# Patient Record
Sex: MALE | Race: BLACK OR AFRICAN AMERICAN | NOT HISPANIC OR LATINO | Employment: FULL TIME | URBAN - METROPOLITAN AREA
[De-identification: names, ages, dates, MRNs, and addresses within clinical notes are randomized per-mention and may not be internally consistent; named-entity substitution may affect disease eponyms.]

---

## 2018-09-04 ENCOUNTER — HOSPITAL ENCOUNTER (EMERGENCY)
Facility: HOSPITAL | Age: 33
Discharge: HOME | End: 2018-09-04
Attending: EMERGENCY MEDICINE

## 2018-09-04 VITALS
WEIGHT: 134 LBS | OXYGEN SATURATION: 92 % | HEIGHT: 64 IN | RESPIRATION RATE: 16 BRPM | SYSTOLIC BLOOD PRESSURE: 131 MMHG | TEMPERATURE: 98 F | DIASTOLIC BLOOD PRESSURE: 58 MMHG | BODY MASS INDEX: 22.88 KG/M2 | HEART RATE: 86 BPM

## 2018-09-04 DIAGNOSIS — R07.9 CHEST PAIN: ICD-10-CM

## 2018-09-04 DIAGNOSIS — D57.00 SICKLE CELL PAIN CRISIS: Primary | ICD-10-CM

## 2018-09-04 LAB
ALBUMIN SERPL BCP-MCNC: 4.3 G/DL
ALP SERPL-CCNC: 96 U/L
ALT SERPL W/O P-5'-P-CCNC: 19 U/L
ANION GAP SERPL CALC-SCNC: 8 MMOL/L
ANISOCYTOSIS BLD QL SMEAR: ABNORMAL
AST SERPL-CCNC: 67 U/L
BACTERIA #/AREA URNS AUTO: ABNORMAL /HPF
BASOPHILS # BLD AUTO: 0.07 K/UL
BASOPHILS NFR BLD: 0.4 %
BILIRUB SERPL-MCNC: 5.3 MG/DL
BILIRUB UR QL STRIP: NEGATIVE
BUN SERPL-MCNC: 6 MG/DL
CALCIUM SERPL-MCNC: 9.6 MG/DL
CHLORIDE SERPL-SCNC: 106 MMOL/L
CLARITY UR REFRACT.AUTO: CLEAR
CO2 SERPL-SCNC: 23 MMOL/L
COLOR UR AUTO: YELLOW
CREAT SERPL-MCNC: 0.8 MG/DL
DACRYOCYTES BLD QL SMEAR: ABNORMAL
DIFFERENTIAL METHOD: ABNORMAL
EOSINOPHIL # BLD AUTO: 0.3 K/UL
EOSINOPHIL NFR BLD: 1.9 %
ERYTHROCYTE [DISTWIDTH] IN BLOOD BY AUTOMATED COUNT: 26.5 %
EST. GFR  (AFRICAN AMERICAN): >60 ML/MIN/1.73 M^2
EST. GFR  (NON AFRICAN AMERICAN): >60 ML/MIN/1.73 M^2
GLUCOSE SERPL-MCNC: 114 MG/DL
GLUCOSE UR QL STRIP: NEGATIVE
HCT VFR BLD AUTO: 21.3 %
HGB BLD-MCNC: 7.7 G/DL
HGB UR QL STRIP: ABNORMAL
HYALINE CASTS UR QL AUTO: 3 /LPF
HYPOCHROMIA BLD QL SMEAR: ABNORMAL
IMM GRANULOCYTES # BLD AUTO: 0.34 K/UL
IMM GRANULOCYTES NFR BLD AUTO: 2.1 %
INR PPP: 1.1
KETONES UR QL STRIP: NEGATIVE
LEUKOCYTE ESTERASE UR QL STRIP: NEGATIVE
LYMPHOCYTES # BLD AUTO: 2.1 K/UL
LYMPHOCYTES NFR BLD: 13.1 %
MCH RBC QN AUTO: 30 PG
MCHC RBC AUTO-ENTMCNC: 36.2 G/DL
MCV RBC AUTO: 83 FL
MICROSCOPIC COMMENT: ABNORMAL
MONOCYTES # BLD AUTO: 2 K/UL
MONOCYTES NFR BLD: 12.1 %
NEUTROPHILS # BLD AUTO: 11.5 K/UL
NEUTROPHILS NFR BLD: 70.4 %
NITRITE UR QL STRIP: NEGATIVE
NRBC BLD-RTO: 3 /100 WBC
PH UR STRIP: 6 [PH] (ref 5–8)
PLATELET # BLD AUTO: 340 K/UL
PMV BLD AUTO: 9.3 FL
POIKILOCYTOSIS BLD QL SMEAR: ABNORMAL
POLYCHROMASIA BLD QL SMEAR: ABNORMAL
POTASSIUM SERPL-SCNC: 4.1 MMOL/L
PROT SERPL-MCNC: 8.1 G/DL
PROT UR QL STRIP: ABNORMAL
PROTHROMBIN TIME: 11.2 SEC
RBC # BLD AUTO: 2.57 M/UL
RBC #/AREA URNS AUTO: 1 /HPF (ref 0–4)
RETICS/RBC NFR AUTO: 12.9 %
SICKLE CELLS BLD QL SMEAR: ABNORMAL
SODIUM SERPL-SCNC: 137 MMOL/L
SP GR UR STRIP: 1.01 (ref 1–1.03)
SQUAMOUS #/AREA URNS AUTO: 0 /HPF
TARGETS BLD QL SMEAR: ABNORMAL
URN SPEC COLLECT METH UR: ABNORMAL
UROBILINOGEN UR STRIP-ACNC: ABNORMAL EU/DL
WBC # BLD AUTO: 16.3 K/UL
WBC #/AREA URNS AUTO: 1 /HPF (ref 0–5)

## 2018-09-04 PROCEDURE — 96375 TX/PRO/DX INJ NEW DRUG ADDON: CPT

## 2018-09-04 PROCEDURE — 25000003 PHARM REV CODE 250: Performed by: PHYSICIAN ASSISTANT

## 2018-09-04 PROCEDURE — 63600175 PHARM REV CODE 636 W HCPCS: Performed by: PHYSICIAN ASSISTANT

## 2018-09-04 PROCEDURE — 99285 EMERGENCY DEPT VISIT HI MDM: CPT | Mod: ,,, | Performed by: PHYSICIAN ASSISTANT

## 2018-09-04 PROCEDURE — 85045 AUTOMATED RETICULOCYTE COUNT: CPT

## 2018-09-04 PROCEDURE — 99284 EMERGENCY DEPT VISIT MOD MDM: CPT | Mod: 25

## 2018-09-04 PROCEDURE — 96361 HYDRATE IV INFUSION ADD-ON: CPT

## 2018-09-04 PROCEDURE — 80053 COMPREHEN METABOLIC PANEL: CPT

## 2018-09-04 PROCEDURE — 96374 THER/PROPH/DIAG INJ IV PUSH: CPT

## 2018-09-04 PROCEDURE — 93010 ELECTROCARDIOGRAM REPORT: CPT | Mod: ,,, | Performed by: INTERNAL MEDICINE

## 2018-09-04 PROCEDURE — 85610 PROTHROMBIN TIME: CPT

## 2018-09-04 PROCEDURE — 93005 ELECTROCARDIOGRAM TRACING: CPT

## 2018-09-04 PROCEDURE — 81001 URINALYSIS AUTO W/SCOPE: CPT

## 2018-09-04 RX ORDER — ONDANSETRON 4 MG/1
4 TABLET, FILM COATED ORAL EVERY 6 HOURS
Qty: 12 TABLET | Refills: 0 | Status: SHIPPED | OUTPATIENT
Start: 2018-09-04 | End: 2019-05-15

## 2018-09-04 RX ORDER — OXYCODONE AND ACETAMINOPHEN 5; 325 MG/1; MG/1
1 TABLET ORAL EVERY 4 HOURS PRN
Qty: 15 TABLET | Refills: 0 | Status: SHIPPED | OUTPATIENT
Start: 2018-09-04 | End: 2019-05-15

## 2018-09-04 RX ORDER — MORPHINE SULFATE 4 MG/ML
2 INJECTION, SOLUTION INTRAMUSCULAR; INTRAVENOUS
Status: COMPLETED | OUTPATIENT
Start: 2018-09-04 | End: 2018-09-04

## 2018-09-04 RX ORDER — KETOROLAC TROMETHAMINE 30 MG/ML
15 INJECTION, SOLUTION INTRAMUSCULAR; INTRAVENOUS
Status: COMPLETED | OUTPATIENT
Start: 2018-09-04 | End: 2018-09-04

## 2018-09-04 RX ADMIN — MORPHINE SULFATE 2 MG: 4 INJECTION, SOLUTION INTRAMUSCULAR; INTRAVENOUS at 04:09

## 2018-09-04 RX ADMIN — KETOROLAC TROMETHAMINE 15 MG: 30 INJECTION, SOLUTION INTRAMUSCULAR at 02:09

## 2018-09-04 RX ADMIN — SODIUM CHLORIDE 1000 ML: 0.9 INJECTION, SOLUTION INTRAVENOUS at 02:09

## 2018-09-04 RX ADMIN — SODIUM CHLORIDE 1000 ML: 0.9 INJECTION, SOLUTION INTRAVENOUS at 04:09

## 2018-09-04 NOTE — ED NOTES
Patient identifiers verified and correct for Petar Sullivan.    LOC: The patient is awake, alert and aware of environment with an appropriate affect, the patient is oriented x 3 and speaking appropriately.  APPEARANCE: Patient resting comfortably and in no acute distress, patient is clean and well groomed, patient's clothing is properly fastened.  SKIN: The skin is warm and dry, color consistent with ethnicity, patient has normal skin turgor and moist mucus membranes, skin intact, no breakdown or bruising noted.  MUSCULOSKELETAL: Patient moving all extremities spontaneously, no obvious swelling or deformities noted.  RESPIRATORY: Airway is open and patent, respirations are spontaneous, patient has a normal effort and rate, no accessory muscle use noted  CARDIAC: Patient has a normal rate and regular rhythm, no peripheral edema noted, capillary refill < 3 seconds.  ABDOMEN: Soft and non tender to palpation, no distention noted  NEUROLOGIC: PERRL, 3mm bilaterally, eyes open spontaneously, behavior appropriate to situation, follows commands, facial expression symmetrical, bilateral hand grasp equal and even, purposeful motor response noted, normal sensation in all extremities when touched with a finger.

## 2018-09-04 NOTE — ED PROVIDER NOTES
"Encounter Date: 9/4/2018       History     Chief Complaint   Patient presents with    Chest Pain     i have sickle cell,      33 year old male with medical history of sickle cell disease, Tobacco use (4 cig/day) presenting to the ED with the chief complaint of chest pain and lower back pain. Patient reports the pain started earlier today. He describes the pain as a "stabbing" sensation and reports the pain feels like his previous sickle cell pain crises. He denies trauma, falls, or other inciting injuries. He has associated SOB that began today as well. He reports 1 episode of clear emesis this morning while changing a tire. He denies fever, abdominal pain, nausea, dysuria, hematuria, diarrhea, constipation, melena, hematochezia. He denies taking any medications for pain. He denies having a Hematologist currently and has not been on any pain medications for over a year. Patient was last hospitalized for sickle cell pain crisis 1 year ago at [facility]. He reports social ETOH use and states he drank wine over this past weekend.           Review of patient's allergies indicates:  No Known Allergies  No past medical history on file.  No past surgical history on file.  No family history on file.  Social History     Tobacco Use    Smoking status: Current Every Day Smoker    Smokeless tobacco: Never Used   Substance Use Topics    Alcohol use: Yes     Frequency: Never     Comment: socially    Drug use: Yes     Types: Marijuana     Review of Systems   Constitutional: Negative for chills, fatigue and fever.   HENT: Negative for congestion, sore throat and trouble swallowing.    Eyes: Negative for pain, redness and visual disturbance.   Respiratory: Positive for shortness of breath.    Cardiovascular: Positive for chest pain.   Gastrointestinal: Positive for vomiting. Negative for abdominal pain, diarrhea and nausea.   Genitourinary: Negative for dysuria and hematuria.   Musculoskeletal: Negative for arthralgias, back pain, " neck pain and neck stiffness.   Skin: Negative for rash and wound.   Neurological: Negative for weakness, light-headedness and headaches.       Physical Exam     Initial Vitals [09/04/18 1352]   BP Pulse Resp Temp SpO2   (!) 109/53 87 18 98.2 °F (36.8 °C) 98 %      MAP       --         Physical Exam    Constitutional: He appears well-developed and well-nourished. He is not diaphoretic. No distress.   HENT:   Head: Normocephalic and atraumatic.   Mouth/Throat: Oropharynx is clear and moist. No oropharyngeal exudate.   Eyes: Pupils are equal, round, and reactive to light.   Neck: Normal range of motion. Neck supple.   Cardiovascular: Normal rate and regular rhythm.   Pulmonary/Chest: Breath sounds normal. No respiratory distress. He has no wheezes. He exhibits no tenderness.   Abdominal: Soft. Bowel sounds are normal. He exhibits no distension. There is no tenderness.   Genitourinary:   Genitourinary Comments: No CVA tenderness   Musculoskeletal: Normal range of motion. He exhibits no edema or tenderness.   No midline spinal tenderness   Neurological: He is alert and oriented to person, place, and time. He has normal strength. No cranial nerve deficit.   Skin: Skin is warm and dry. No erythema.       ED Course   Procedures  Labs Reviewed   CBC W/ AUTO DIFFERENTIAL - Abnormal; Notable for the following components:       Result Value    WBC 16.30 (*)     RBC 2.57 (*)     Hemoglobin 7.7 (*)     Hematocrit 21.3 (*)     MCHC 36.2 (*)     RDW 26.5 (*)     Immature Granulocytes 2.1 (*)     Gran # (ANC) 11.5 (*)     Immature Grans (Abs) 0.34 (*)     Mono # 2.0 (*)     nRBC 3 (*)     Lymph% 13.1 (*)     Sickle Cells Moderate (*)     All other components within normal limits    Narrative:     ADD ON PT PER: STANLEY ROSAS MD  09/04/2018  15:23    COMPREHENSIVE METABOLIC PANEL - Abnormal; Notable for the following components:    Glucose 114 (*)     Total Bilirubin 5.3 (*)     AST 67 (*)     All other components within  normal limits   RETICULOCYTES - Abnormal; Notable for the following components:    Retic 12.9 (*)     All other components within normal limits    Narrative:     ADD ON PT PER: STANLEY ROSAS MD  09/04/2018  15:23    URINALYSIS, REFLEX TO URINE CULTURE - Abnormal; Notable for the following components:    Protein, UA 1+ (*)     Occult Blood UA 1+ (*)     Urobilinogen, UA 2.0-3.0 (*)     All other components within normal limits    Narrative:     Preferred Collection Type->Urine, Clean Catch  CUP   URINALYSIS MICROSCOPIC - Abnormal; Notable for the following components:    Hyaline Casts, UA 3 (*)     All other components within normal limits    Narrative:     Preferred Collection Type->Urine, Clean Catch  CUP   PROTIME-INR   PROTIME-INR    Narrative:     ADD ON PT PER: STANLEY ROSAS MD  09/04/2018  15:23           Imaging Results          X-Ray Chest PA And Lateral (Final result)  Result time 09/04/18 15:19:49    Final result by Denzel Azul MD (09/04/18 15:19:49)                 Impression:      No acute abnormality.  Mild reticular opacities in the right mid/lower lung likely relate to chronic sequela of sickle cell disease.      Electronically signed by: Denzel Azul MD  Date:    09/04/2018  Time:    15:19             Narrative:    EXAMINATION:  XR CHEST PA AND LATERAL    CLINICAL HISTORY:  Chest pain, unspecified    Reported history of sickle cell per ED note.    TECHNIQUE:  PA and lateral views of the chest were performed.    COMPARISON:  None    FINDINGS:  Mild reticular opacities are present in the right mid/lower lung.  Lungs are otherwise clear with no focal consolidative opacity.  No pleural effusion or pneumothorax.    The cardiomediastinal silhouette is normal.    Bones are intact.                                       APC / Resident Notes:   33 year old male with medical history of sickle cell disease, Tobacco use (4 cig/day) presenting to the ED c/o chest pain, lower back pain, emesis x1  starting today. DDx includes but not limited to sickle cell crisis, electrolyte disturbance, pneumonia, dehydration, symptomatic anemia, UTI, pyelonephritis. I have considered but do not suspect ACS. Will get labs. Will give fluids and Toradol for pain.      Work-up significant for WBC 16, H/H 7.7/21.3, Moderate sickle cells, Reticulocytes elevated 12.9, Tbili elevated 5.3, UA negative for UTI or occult blood. ECG NSR at 72 bpm without ST ischemic findings.     Etiology consistent with sickle cell crisis. No previous labs in our system for comparison. Patient denies having hematologist or pain management at this time. Patient reports improvement in his pain after 2L and 1 dose of Morphine in the ED. Discussed treatment options including hospitalization, and patient prefers to return home at this time. Will give short RX for Percocet for ongoing management of his pain.  reviewed with no opiate prescriptions in the last year. Contact information given to patient to establish care with hematology. Return to ED precautions given for new, worsening, or concerning symptoms. I have discussed the care of this patient with my supervising physician.          Attending Attestation:     Physician Attestation Statement for NP/PA:   I discussed this assessment and plan of this patient with the NP/PA, but I did not personally examine the patient. The face to face encounter was performed by the NP/PA.    Other NP/PA Attestation Additions:    History of Present Illness:   33-year-old man with comorbidity of sickle cell disease presents for evaluation of worsening diffuse pain.                      Clinical Impression:   The primary encounter diagnosis was Sickle cell pain crisis. A diagnosis of Chest pain was also pertinent to this visit.      Disposition:   Disposition: Discharged  Condition: Stable                        Koffi Schultz PA-C  09/04/18 2059

## 2018-09-04 NOTE — DISCHARGE INSTRUCTIONS
Please hydrate by drinking plenty of fluids when you return home. Please use the resources below to establish yourself with a primary care provider. Please return to the ED for new, worsening, or concerning symptoms.

## 2018-09-04 NOTE — ED NOTES
Patient was lying down when he started having reproducible chest and back pain. Patient reports pain is sickle cell pain. Patient reports last pain crisis was 1 year ago.  Patient denies cardiac history. Denies back injury. Patient rates pain 5/10 at this time.

## 2018-10-10 ENCOUNTER — OCCUPATIONAL HEALTH (OUTPATIENT)
Dept: URGENT CARE | Facility: CLINIC | Age: 33
End: 2018-10-10

## 2018-10-10 DIAGNOSIS — Z02.83 ENCOUNTER FOR DRUG SCREENING: Primary | ICD-10-CM

## 2018-10-10 PROCEDURE — 80305 DRUG TEST PRSMV DIR OPT OBS: CPT | Mod: S$GLB,,, | Performed by: NURSE PRACTITIONER

## 2019-05-15 ENCOUNTER — HOSPITAL ENCOUNTER (EMERGENCY)
Facility: HOSPITAL | Age: 34
Discharge: HOME | End: 2019-05-15
Attending: EMERGENCY MEDICINE
Payer: COMMERCIAL

## 2019-05-15 VITALS
WEIGHT: 138 LBS | RESPIRATION RATE: 18 BRPM | HEART RATE: 89 BPM | BODY MASS INDEX: 22.99 KG/M2 | OXYGEN SATURATION: 97 % | DIASTOLIC BLOOD PRESSURE: 70 MMHG | TEMPERATURE: 99 F | HEIGHT: 65 IN | SYSTOLIC BLOOD PRESSURE: 121 MMHG

## 2019-05-15 DIAGNOSIS — R10.9 FLANK PAIN: Primary | ICD-10-CM

## 2019-05-15 LAB
ALBUMIN SERPL BCP-MCNC: 3.8 G/DL (ref 3.5–5.2)
ALP SERPL-CCNC: 82 U/L (ref 55–135)
ALT SERPL W/O P-5'-P-CCNC: 11 U/L (ref 10–44)
ANION GAP SERPL CALC-SCNC: 8 MMOL/L (ref 8–16)
ANISOCYTOSIS BLD QL SMEAR: ABNORMAL
AST SERPL-CCNC: 44 U/L (ref 10–40)
BASOPHILS # BLD AUTO: 0.06 K/UL (ref 0–0.2)
BASOPHILS NFR BLD: 0.5 % (ref 0–1.9)
BILIRUB SERPL-MCNC: 2.9 MG/DL (ref 0.1–1)
BUN SERPL-MCNC: 5 MG/DL (ref 6–20)
CALCIUM SERPL-MCNC: 9.3 MG/DL (ref 8.7–10.5)
CHLORIDE SERPL-SCNC: 110 MMOL/L (ref 95–110)
CO2 SERPL-SCNC: 20 MMOL/L (ref 23–29)
CREAT SERPL-MCNC: 0.7 MG/DL (ref 0.5–1.4)
DIFFERENTIAL METHOD: ABNORMAL
EOSINOPHIL # BLD AUTO: 0.8 K/UL (ref 0–0.5)
EOSINOPHIL NFR BLD: 6.9 % (ref 0–8)
ERYTHROCYTE [DISTWIDTH] IN BLOOD BY AUTOMATED COUNT: 25.2 % (ref 11.5–14.5)
EST. GFR  (AFRICAN AMERICAN): >60 ML/MIN/1.73 M^2
EST. GFR  (NON AFRICAN AMERICAN): >60 ML/MIN/1.73 M^2
GLUCOSE SERPL-MCNC: 81 MG/DL (ref 70–110)
HCT VFR BLD AUTO: 21.7 % (ref 40–54)
HGB BLD-MCNC: 7.9 G/DL (ref 14–18)
HOWELL-JOLLY BOD BLD QL SMEAR: ABNORMAL
IMM GRANULOCYTES # BLD AUTO: 0.08 K/UL (ref 0–0.04)
IMM GRANULOCYTES NFR BLD AUTO: 0.7 % (ref 0–0.5)
LYMPHOCYTES # BLD AUTO: 3.1 K/UL (ref 1–4.8)
LYMPHOCYTES NFR BLD: 28 % (ref 18–48)
MCH RBC QN AUTO: 27.3 PG (ref 27–31)
MCHC RBC AUTO-ENTMCNC: 36.4 G/DL (ref 32–36)
MCV RBC AUTO: 75 FL (ref 82–98)
MONOCYTES # BLD AUTO: 1.3 K/UL (ref 0.3–1)
MONOCYTES NFR BLD: 11.4 % (ref 4–15)
NEUTROPHILS # BLD AUTO: 5.8 K/UL (ref 1.8–7.7)
NEUTROPHILS NFR BLD: 52.5 % (ref 38–73)
NRBC BLD-RTO: 1 /100 WBC
PLATELET # BLD AUTO: 411 K/UL (ref 150–350)
PLATELET BLD QL SMEAR: ABNORMAL
PMV BLD AUTO: 10 FL (ref 9.2–12.9)
POIKILOCYTOSIS BLD QL SMEAR: ABNORMAL
POLYCHROMASIA BLD QL SMEAR: ABNORMAL
POTASSIUM SERPL-SCNC: 4.3 MMOL/L (ref 3.5–5.1)
PROT SERPL-MCNC: 7.6 G/DL (ref 6–8.4)
RBC # BLD AUTO: 2.89 M/UL (ref 4.6–6.2)
RETICS/RBC NFR AUTO: 8.6 % (ref 0.4–2)
SICKLE CELLS BLD QL SMEAR: ABNORMAL
SODIUM SERPL-SCNC: 138 MMOL/L (ref 136–145)
WBC # BLD AUTO: 10.99 K/UL (ref 3.9–12.7)

## 2019-05-15 PROCEDURE — 25500020 PHARM REV CODE 255: Performed by: EMERGENCY MEDICINE

## 2019-05-15 PROCEDURE — 80053 COMPREHEN METABOLIC PANEL: CPT

## 2019-05-15 PROCEDURE — 99284 EMERGENCY DEPT VISIT MOD MDM: CPT | Mod: ,,, | Performed by: EMERGENCY MEDICINE

## 2019-05-15 PROCEDURE — 99285 EMERGENCY DEPT VISIT HI MDM: CPT | Mod: 25

## 2019-05-15 PROCEDURE — 93010 EKG 12-LEAD: ICD-10-PCS | Mod: ,,, | Performed by: INTERNAL MEDICINE

## 2019-05-15 PROCEDURE — 85025 COMPLETE CBC W/AUTO DIFF WBC: CPT

## 2019-05-15 PROCEDURE — 85045 AUTOMATED RETICULOCYTE COUNT: CPT

## 2019-05-15 PROCEDURE — 99284 PR EMERGENCY DEPT VISIT,LEVEL IV: ICD-10-PCS | Mod: ,,, | Performed by: EMERGENCY MEDICINE

## 2019-05-15 PROCEDURE — 93005 ELECTROCARDIOGRAM TRACING: CPT

## 2019-05-15 PROCEDURE — 93010 ELECTROCARDIOGRAM REPORT: CPT | Mod: ,,, | Performed by: INTERNAL MEDICINE

## 2019-05-15 RX ADMIN — IOHEXOL 100 ML: 350 INJECTION, SOLUTION INTRAVENOUS at 12:05

## 2019-05-15 NOTE — ED PROVIDER NOTES
"Encounter Date: 5/15/2019    SCRIBE #1 NOTE: I, Son Di, am scribing for, and in the presence of,  Dr. Guzman . I have scribed the entire note.       History     Chief Complaint   Patient presents with    Flank Pain     hx sickle cell and PE     34 y.o. male with history of sickle cell presenting with a chief complaint of 6/10 left-sided back pain that began yesterday. Patient reports of similar quality of pain when he was diagnosed with a PE in the past where he was placed on a course of anticoagulants which he had finished. He describes his pain as if "something is inflamed" to the left side of his back. He notes that his pain does not worsen with deep inspiration like in the past when he had a PE. His pain is not similar to his sickle cell pain crisis.     The history is provided by the patient and medical records.     Review of patient's allergies indicates:  No Known Allergies  Past Medical History:   Diagnosis Date    Sickle cell anemia      History reviewed. No pertinent surgical history.  History reviewed. No pertinent family history.  Social History     Tobacco Use    Smoking status: Current Every Day Smoker    Smokeless tobacco: Never Used   Substance Use Topics    Alcohol use: Yes     Frequency: Never     Comment: socially    Drug use: Yes     Types: Marijuana     Review of Systems   Constitutional: Negative for chills and fever.   HENT: Negative for congestion.    Eyes: Negative for visual disturbance.   Respiratory: Negative for shortness of breath.    Cardiovascular: Negative for chest pain.   Gastrointestinal: Negative for diarrhea, nausea and vomiting.   Genitourinary: Negative for dysuria.   Musculoskeletal: Positive for back pain.   Skin: Negative for rash.   Neurological: Negative for headaches.       Physical Exam     Initial Vitals [05/15/19 1026]   BP Pulse Resp Temp SpO2   (!) 125/58 78 17 98.7 °F (37.1 °C) 95 %      MAP       --         Physical Exam    Nursing note and vitals " reviewed.  Constitutional: He appears well-developed and well-nourished. He is not diaphoretic. No distress.   HENT:   Head: Normocephalic and atraumatic.   Mouth/Throat: Oropharynx is clear and moist.   Eyes: Conjunctivae and EOM are normal. Pupils are equal, round, and reactive to light.   Neck: Normal range of motion. Neck supple. No JVD present.   Cardiovascular: Normal rate, regular rhythm and normal heart sounds.   No murmur heard.  Equal radial pulses no signs of DVT   Pulmonary/Chest: Breath sounds normal. No respiratory distress. He has no wheezes. He has no rhonchi. He has no rales. He exhibits tenderness.   Tenderness to left lateral chest   Abdominal: Soft. Bowel sounds are normal. He exhibits no distension and no mass. There is no tenderness. There is no rebound and no guarding.   Musculoskeletal: Normal range of motion. He exhibits no edema or tenderness.   Neurological: He is alert and oriented to person, place, and time. He has normal strength. No cranial nerve deficit or sensory deficit.   Skin: Skin is warm and dry. No rash noted.   Psychiatric: He has a normal mood and affect.         ED Course   Procedures  Labs Reviewed   CBC W/ AUTO DIFFERENTIAL - Abnormal; Notable for the following components:       Result Value    RBC 2.89 (*)     Hemoglobin 7.9 (*)     Hematocrit 21.7 (*)     Mean Corpuscular Volume 75 (*)     Mean Corpuscular Hemoglobin Conc 36.4 (*)     RDW 25.2 (*)     Platelets 411 (*)     Immature Granulocytes 0.7 (*)     Immature Grans (Abs) 0.08 (*)     Mono # 1.3 (*)     Eos # 0.8 (*)     nRBC 1 (*)     Platelet Estimate Increased (*)     Sickle Cells Moderate (*)     All other components within normal limits   COMPREHENSIVE METABOLIC PANEL - Abnormal; Notable for the following components:    CO2 20 (*)     BUN, Bld 5 (*)     Total Bilirubin 2.9 (*)     AST 44 (*)     All other components within normal limits   RETICULOCYTES - Abnormal; Notable for the following components:    Retic  8.6 (*)     All other components within normal limits     EKG Readings: (Independently Interpreted)   Rhythm: Normal Sinus Rhythm. Heart Rate: 73 .   No ischemic changes     ECG Results          EKG 12-lead (Final result)  Result time 05/15/19 15:51:30    Final result by Jamie, [facility] (05/15/19 15:51:30)                 Narrative:    Test Reason : R10.9    Vent. Rate : 073 BPM     Atrial Rate : 073 BPM     P-R Int : 146 ms          QRS Dur : 092 ms      QT Int : 406 ms       P-R-T Axes : 043 -45 021 degrees     QTc Int : 447 ms    Normal sinus rhythm  Left anterior fascicular block  Abnormal ECG  When compared with ECG of 04-SEP-2018 13:58,  No significant change was found  Confirmed by MICHEL PRO MD ([id number]) on 5/15/2019 3:51:18 PM    Referred By:             Confirmed By:MICHEL PRO MD                            Imaging Results          CTA Chest Non-Coronary (PE Study) (Final result)  Result time 05/15/19 14:21:18    Final result by Aurora Tolliver MD (05/15/19 14:21:18)                 Impression:      No evidence of pulmonary thromboembolism or infarct.    Nodularity of the trachea on the left above the level of the clavicular heads, this may represent tenacious secretions, however lesion cannot be excluded.  Consider direct visualization with bronchoscopy or repeat CT for further evaluation.    Ground-glass opacities in a bibasilar distribution.  Pulmonary cystic lesions with perceptible walls raising the question for possible pulmonary Langerhans histiocytosis.    Electronically signed by resident: Areli Vaz  Date:    05/15/2019  Time:    13:09    Electronically signed by: Aurora Tolliver MD  Date:    05/15/2019  Time:    14:21             Narrative:    EXAMINATION:  CTA CHEST NON CORONARY    CLINICAL HISTORY:  Chest pain, acute, PE suspected, high pretest prob;    TECHNIQUE:  During intravenous bolus injection of 100 ml of Omnipaque 350 contrast medium and using low dose technique, the chest was  surveyed from above the pulmonary apices through the posterior costophrenic angles using low dose technique. Data was reconstructed for multiplanar images in axial, sagittal and coronal planes and for maximal intensity projection images in the axial plane.  Images were acquired without gating.    COMPARISON:  Chest radiograph PA and lateral 09/04/2018.    FINDINGS:  Base of Neck: No significant abnormality.    Aorta: Left-sided aortic arch with 3 arterial branches.  The aorta maintains normal caliber, contour and course. There is no calcification of the thoracic aorta.  There is  no coronary artery calcification.    Heart/pericardium: Normal size.  No pericardial effusion or calcification.    Pulmonary arteries: Pulmonary arteries distribute normally.  Pulmonary arteries are sufficiently opacified for diagnostic assessment.  There is no pulmonary thromboembolism or other filling defect.  There is no pulmonary infarct.    Pulmonary veins, left atrium:    There are four pulmonary veins that return to the left atrium.    Vanesa/Mediastinum: No pathologic vin enlargement.    Pleura: No pleural fluid.No pleural calcification.    Esophagus: Normal.    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    Thoracic soft tissues: Normal. Both breasts are present.    Bones: No acute fracture. No suspicious lytic or sclerotic lesion.    Airways: There is nodularity of the internal left trachea above the level of the clavicular heads, which may represent tenacious secretions however lesion cannot be excluded.    Lungs: Accounting for significant motion artifact, there is ground-glass opacity in a primarily bibasilar distribution.  Additionally, there are cystic lesions and the possibility of pulmonary Langerhans histiocytosis is not entirely excluded.                                 Medical Decision Making:   History:   Old Medical Records: I decided to obtain old medical records.  Initial Assessment:    Patient with history of sickle cell disease and pulmonary embolism. He has pain similar to his previous PE. He completed his course of anticoagulants. He does not appear in distress but with his risk factors, will acquire PE study of his chest. I doubt this is a cardiac event, sickle cell pain crisis, or acute chest syndrome.   Independently Interpreted Test(s):   I have ordered and independently interpreted EKG Reading(s) - see prior notes  Clinical Tests:   Lab Tests: Ordered and Reviewed  Radiological Study: Ordered and Reviewed  Medical Tests: Ordered and Reviewed  ED Management:  2:35 PM  No signs of PE. Will discharge home.                       Clinical Impression:       ICD-10-CM ICD-9-CM   1. Flank pain R10.9 789.09         Disposition:   Disposition: Discharged                        Flako Guzman MD  05/19/19 [id number]

## 2019-05-15 NOTE — ED TRIAGE NOTES
Patient reports to the ED today with L rib pain onset yesterday. Denies SOB. Hx of PE and sickle cell.

## 2019-05-15 NOTE — ED NOTES
Patient identifiers verified and correct for Petar Sullivan  LOC: The patient is awake, alert and aware of environment with an appropriate affect, the patient is oriented x 3 and speaking appropriately.   APPEARANCE: Patient appears comfortable and in no acute distress, patient is clean and well groomed.  SKIN: The skin is warm and dry, color consistent with ethnicity, patient has normal skin turgor and moist mucus membranes, skin intact, no breakdown or bruising noted.   MUSCULOSKELETAL: Patient moving all extremities spontaneously, no swelling noted. Reports of L rib pain  RESPIRATORY: Airway is open and patent, respirations are spontaneous, patient has a normal effort and rate, no accessory muscle use noted, O2 sats noted at 95% on room air.  CARDIAC: Denies chest pain capillary refill < 3 seconds.   GASTRO: Soft and non tender to palpation, no distention noted, normoactive bowel sounds present in all four quadrants. Pt states bowel movements have been regular.  : Pt denies any pain or frequency with urination.  NEURO: Pt opens eyes spontaneously, behavior appropriate to situation, follows commands, facial expression symmetrical, bilateral hand grasp equal and even, purposeful motor response noted, normal sensation in all extremities when touched with a finger.